# Patient Record
Sex: FEMALE | Race: WHITE | URBAN - METROPOLITAN AREA
[De-identification: names, ages, dates, MRNs, and addresses within clinical notes are randomized per-mention and may not be internally consistent; named-entity substitution may affect disease eponyms.]

---

## 2017-05-08 DIAGNOSIS — Z13.71 TESTING OF FEMALE FOR GENETIC DISEASE CARRIER STATUS: Primary | ICD-10-CM

## 2018-03-20 ENCOUNTER — DOCUMENTATION ONLY (OUTPATIENT)
Dept: CONSULT | Facility: CLINIC | Age: 33
End: 2018-03-20

## 2018-03-20 NOTE — TELEPHONE ENCOUNTER
Letter of Medical Necessity/Prior Authorization      TO: Payor: [employer]          Re: Jocelyn Young  : 1985  ID Number: [id number]   Group Number: [id number]      To Whom It May Concern:      This letter of medical necessity is being submitted for approval of coverage for genetic testing for a familial chromosome 1q duplication for member, Jocelyn Young.      Jocelyn's 1-year-old daughter, Yudelka, was found to have a duplication in the long arm of chromosome 1 at band 1q21.1.  This specific chromosome 1 duplication is associated with cognitive impairment, developmental delays, seizures, autism and other behavior problems, facial dysmorphism, various birth defects such as heart defects and brain abnormalities, and vision problems.  Jocelyn's daughter has pulmonary hypertension, failure to thrive, developmental delays, and distinctive facial features (midface hypoplasia).    The chromosome 1q duplication may have occurred brand new at Yudelka's conception (de santiago), or the deletion may have been inherited from one of Santanas parents.  Some individuals can have a chromosome duplication and have subtle features, such that s/he is unaware that they have the deletion.  Therefore, testing has been recommended for Jocelyn and her  to determine if the duplication occurred de santiago in Yudelka, or if the duplication was inherited.       If we learn that Yudelka petersen duplication is de santiago (not inherited), this would suggest a relatively low chance to happen again in another child.  If we learn that the duplication was inherited, the risk of recurrence (risk to another child) would be 50%.  In this scenario, prenatal diagnosis via fetal chromosome testing would be an option during a future pregnancy if desired.  Jocelyn explained that they do plan to expand their family, and that it would be helpful to have more definitive information about the risk of recurrence.  This would also help to  inform prenatal testing options in the future.      Targeted duplication testing for Jocelyn and her  will involve limited array comparative genomic hybridization (array CGH) testing.  Targeted testing is less expensive than full chromosome analysis, and results are usually available in a few weeks.  Testing will be performed by the [facility] lab.      This testing is considered to be medically necessary because the results from the testing could indicate a possible diagnosis for Jocelyn which would alter her medical management, and the results from the testing would clarify Jocelyn s future reproductive risks and help to inform reproductive decisions.      Mrs. Young s motivation should be clear.  She wishes to obtain the most information possible about her risk to have another child with the chromosome 1 duplication, and she would like to determine whether she may have a mild form of this disorder.  This recommended testing is medically necessary as the results from the testing would significantly affect Jocelyn's reproductive risks and decisions, and would provide guidance for future medical management.  Relevant CPT and ICD 10 codes are listed below.         Thank you for your prompt consideration for coverage of this laboratory testing ordered for Jocelyn Young.  Please feel free to contact me at [phone] should you have any questions or concerns.      Sincerely,            MD Janette Nicolas, MS, [facility]  Division of Genetics and Metabolism             Certified Genetic Counselor  Department of Pediatrics                                Division of Genetics and Metabolism       FAX: [phone]  ____________________________  CPT Code: 13059, 50688  ICD 10 Code: Z82.79 and Z31.430

## 2018-12-10 ENCOUNTER — TELEPHONE (OUTPATIENT)
Dept: CONSULT | Facility: CLINIC | Age: 33
End: 2018-12-10